# Patient Record
Sex: FEMALE | Race: WHITE | NOT HISPANIC OR LATINO | URBAN - METROPOLITAN AREA
[De-identification: names, ages, dates, MRNs, and addresses within clinical notes are randomized per-mention and may not be internally consistent; named-entity substitution may affect disease eponyms.]

---

## 2024-06-04 ENCOUNTER — TELEPHONE (OUTPATIENT)
Dept: SURGERY | Facility: CLINIC | Age: 59
End: 2024-06-04
Payer: COMMERCIAL

## 2024-06-04 NOTE — TELEPHONE ENCOUNTER
----- Message from Can Mcfarland sent at 6/4/2024  2:20 PM CDT -----  Huang Dozier,    Mrs. Hilton called us to schedule with Oncology in [place], but she has not had a biopsy yet. Images from [id number] were completed at [facility]. The patient's demographics and insurance have been confirmed. Please let me know if I can assist.    Thank you,    Can Mcfarland  [facility]

## 2024-06-07 ENCOUNTER — TELEPHONE (OUTPATIENT)
Dept: SURGERY | Facility: CLINIC | Age: 59
End: 2024-06-07
Payer: COMMERCIAL

## 2024-06-07 NOTE — TELEPHONE ENCOUNTER
----- Message from Can Mcfarland sent at 6/7/2024 10:18 AM CDT -----  Huang Dozier,    The patient contacted us to see if we received the images and radiology reports. She said you can send her a message on the [facility] to let her know.    Thank you,    Can Mcfarland  [facility]

## 2024-06-11 ENCOUNTER — HOSPITAL ENCOUNTER (OUTPATIENT)
Dept: RADIOLOGY | Facility: HOSPITAL | Age: 59
Discharge: HOME | End: 2024-06-11
Attending: NURSE PRACTITIONER
Payer: COMMERCIAL

## 2024-06-13 ENCOUNTER — TELEPHONE (OUTPATIENT)
Dept: SURGERY | Facility: CLINIC | Age: 59
End: 2024-06-13
Payer: COMMERCIAL

## 2024-06-13 NOTE — TELEPHONE ENCOUNTER
----- Message from Isaiah Metcalf LPN sent at 6/13/2024  8:13 AM CDT -----  Regarding: abn mammo  Hi, pt will need mammo and u/s to determine how may biopsies will need to be scheduled, (up to 3) I can do her imaging next week Tue, Wed or Thursday to see how may biopsies will need to be scheduled.     Thank you,

## 2024-06-13 NOTE — TELEPHONE ENCOUNTER
----- Message from Can Mcfarland sent at 6/4/2024  2:20 PM CDT -----  Huang Dozier,    Mrs. Hilton called us to schedule with Oncology in [place], but she has not had a biopsy yet. Images from [id number] were completed at [facility]. The patient's demographics and insurance have been confirmed. Please let me know if I can assist.    Thank you,    Can Mcfarland  Oncology [facility]

## 2024-08-01 DIAGNOSIS — C50.912 MALIGNANT NEOPLASM OF UNSPECIFIED SITE OF LEFT FEMALE BREAST: ICD-10-CM

## 2024-08-06 ENCOUNTER — HOSPITAL ENCOUNTER (OUTPATIENT)
Dept: CARDIOLOGY | Facility: HOSPITAL | Age: 59
Discharge: HOME | End: 2024-08-06
Attending: INTERNAL MEDICINE
Payer: COMMERCIAL

## 2024-08-06 DIAGNOSIS — C50.912 MALIGNANT NEOPLASM OF UNSPECIFIED SITE OF LEFT FEMALE BREAST: ICD-10-CM

## 2024-08-06 LAB
APICAL FOUR CHAMBER EJECTION FRACTION: 59 %
APICAL TWO CHAMBER EJECTION FRACTION: 63 %
AV INDEX (PROSTH): 0.82
AV MEAN GRADIENT: 4 MMHG
AV PEAK GRADIENT: 7 MMHG
AV VALVE AREA BY VELOCITY RATIO: 2.43 CM²
AV VALVE AREA: 2.34 CM²
AV VELOCITY RATIO: 0.86
CV ECHO LV RWT: 0.42 CM
DOP CALC AO PEAK VEL: 1.33 M/S
DOP CALC AO VTI: 26.8 CM
DOP CALC LVOT AREA: 2.8 CM2
DOP CALC LVOT DIAMETER: 1.9 CM
DOP CALC LVOT PEAK VEL: 1.14 M/S
DOP CALC LVOT STROKE VOLUME: 62.63 CM3
DOP CALC MV VTI: 28.9 CM
DOP CALCLVOT PEAK VEL VTI: 22.1 CM
E WAVE DECELERATION TIME: 190 MSEC
E/A RATIO: 1.05
E/E' RATIO: 7.76 M/S
ECHO LV POSTERIOR WALL: 0.8 CM (ref 0.6–1.1)
FRACTIONAL SHORTENING: 34 % (ref 28–44)
GLOBAL LONGITUIDAL STRAIN: 19.6 %
HR MV ECHO: 73 BPM
INTERVENTRICULAR SEPTUM: 0.8 CM (ref 0.6–1.1)
LEFT ATRIUM AREA SYSTOLIC (APICAL 2 CHAMBER): 9.16 CM2
LEFT ATRIUM AREA SYSTOLIC (APICAL 4 CHAMBER): 11.2 CM2
LEFT ATRIUM SIZE: 2.6 CM
LEFT INTERNAL DIMENSION IN SYSTOLE: 2.5 CM (ref 2.1–4)
LEFT VENTRICLE DIASTOLIC VOLUME: 62 ML
LEFT VENTRICLE END DIASTOLIC VOLUME APICAL 2 CHAMBER: 45.3 ML
LEFT VENTRICLE END DIASTOLIC VOLUME APICAL 4 CHAMBER: 40.8 ML
LEFT VENTRICLE END SYSTOLIC VOLUME APICAL 2 CHAMBER: 20.1 ML
LEFT VENTRICLE END SYSTOLIC VOLUME APICAL 4 CHAMBER: 21.3 ML
LEFT VENTRICLE SYSTOLIC VOLUME: 22.3 ML
LEFT VENTRICULAR INTERNAL DIMENSION IN DIASTOLE: 3.8 CM (ref 3.5–6)
LEFT VENTRICULAR MASS: 85.96 G
LV LATERAL E/E' RATIO: 7.46 M/S
LV SEPTAL E/E' RATIO: 8.08 M/S
LVED V (TEICH): 62 ML
LVES V (TEICH): 22.3 ML
LVOT MG: 3 MMHG
LVOT MV: 0.73 CM/S
MV MEAN GRADIENT: 2 MMHG
MV PEAK A VEL: 0.92 M/S
MV PEAK E VEL: 0.97 M/S
MV PEAK GRADIENT: 5 MMHG
MV STENOSIS PRESSURE HALF TIME: 74 MS
MV VALVE AREA BY CONTINUITY EQUATION: 2.17 CM2
MV VALVE AREA P 1/2 METHOD: 2.97 CM2
OHS LV EJECTION FRACTION SIMPSONS BIPLANE MOD: 58 %
PISA TR MAX VEL: 2.7 M/S
RA PRESSURE ESTIMATED: 3 MMHG
RV TB RVSP: 6 MMHG
SINUS: 2.7 CM
TDI LATERAL: 0.13 M/S
TDI SEPTAL: 0.12 M/S
TDI: 0.13 M/S
TR MAX PG: 29 MMHG
TRICUSPID ANNULAR PLANE SYSTOLIC EXCURSION: 2.17 CM
TV REST PULMONARY ARTERY PRESSURE: 32 MMHG

## 2024-08-06 PROCEDURE — 93306 TTE W/DOPPLER COMPLETE: CPT

## 2024-08-06 PROCEDURE — 93356 MYOCRD STRAIN IMG SPCKL TRCK: CPT | Mod: ,,, | Performed by: INTERNAL MEDICINE

## 2024-08-06 PROCEDURE — 93306 TTE W/DOPPLER COMPLETE: CPT | Mod: 26,,, | Performed by: INTERNAL MEDICINE
